# Patient Record
Sex: MALE | URBAN - METROPOLITAN AREA
[De-identification: names, ages, dates, MRNs, and addresses within clinical notes are randomized per-mention and may not be internally consistent; named-entity substitution may affect disease eponyms.]

---

## 2021-01-04 ENCOUNTER — APPOINTMENT (RX ONLY)
Dept: URBAN - METROPOLITAN AREA CLINIC 129 | Facility: CLINIC | Age: 81
Setting detail: DERMATOLOGY
End: 2021-01-04

## 2021-01-04 DIAGNOSIS — L29.8 OTHER PRURITUS: ICD-10-CM

## 2021-01-04 DIAGNOSIS — L29.89 OTHER PRURITUS: ICD-10-CM

## 2021-01-04 DIAGNOSIS — L30.0 NUMMULAR DERMATITIS: ICD-10-CM

## 2021-01-04 PROCEDURE — 99203 OFFICE O/P NEW LOW 30 MIN: CPT | Mod: 25

## 2021-01-04 PROCEDURE — ? PRESCRIPTION

## 2021-01-04 PROCEDURE — ? INJECTION

## 2021-01-04 PROCEDURE — 96372 THER/PROPH/DIAG INJ SC/IM: CPT

## 2021-01-04 PROCEDURE — ? COUNSELING

## 2021-01-04 RX ORDER — TRIAMCINOLONE ACETONIDE 1 MG/G
OINTMENT TOPICAL
Qty: 1 | Refills: 1 | Status: CANCELLED
Stop reason: SDUPTHER

## 2021-01-04 ASSESSMENT — LOCATION DETAILED DESCRIPTION DERM
LOCATION DETAILED: RIGHT INFERIOR MEDIAL MIDBACK
LOCATION DETAILED: RIGHT BUTTOCK
LOCATION DETAILED: EPIGASTRIC SKIN

## 2021-01-04 ASSESSMENT — LOCATION SIMPLE DESCRIPTION DERM
LOCATION SIMPLE: RIGHT LOWER BACK
LOCATION SIMPLE: RIGHT BUTTOCK
LOCATION SIMPLE: ABDOMEN

## 2021-01-04 ASSESSMENT — LOCATION ZONE DERM: LOCATION ZONE: TRUNK

## 2021-01-04 NOTE — PROCEDURE: INJECTION
Dose Administered (Numbers Only - Mg, G, Mcg, Units, Cc): 40
Medication (1) And Associated J-Code Units: Triamcinolone acetonide, 10mg
Units: mg
Route: IM
Medication (2) And Associated J-Code Units: Dexamethasone Sodium Phosphate, 4mg
Procedure Information: Please note that the numeric value listed in the Medication (1) and associated J-code units and Medication (2) and associated J-code units variables are j-code amounts and do not represent either the concentration or the total amount of the medications injected.  I strongly recommend selecting no to the Render J-code information in note question. This will allow your note to be more clear. If you are billing j-codes with your injection codes you need to document the total amount of the medication injected. This amount should match the j-code units. For example, if you are injecting Triamcinolone 40mg as an intramuscular injection you would select 40 for the dose field and mg for the units. This would allow you to document  with 4 units (40mg = 10mg x 4). The total volume is not used to calculate j-codes only the amount of the medication administered.
Dose Administered (Numbers Only - Mg, G, Mcg, Units, Cc): 4
Bill J-Code: yes
Additional Comments: Kenalog 40/ Dexamethasone 4 IM
Treatment Number: 0
Post-Care Instructions: I reviewed with the patient in detail post-care instructions. Patient understands to keep the injection sites clean and call the clinic if there is any redness, swelling or pain.
Consent: The risks of the medication were reviewed with the patient.
Hide Second Medication?: No
Detail Level: Simple

## 2021-01-18 ENCOUNTER — APPOINTMENT (RX ONLY)
Dept: URBAN - METROPOLITAN AREA CLINIC 129 | Facility: CLINIC | Age: 81
Setting detail: DERMATOLOGY
End: 2021-01-18

## 2021-01-18 DIAGNOSIS — L85.3 XEROSIS CUTIS: ICD-10-CM

## 2021-01-18 DIAGNOSIS — L30.0 NUMMULAR DERMATITIS: ICD-10-CM | Status: IMPROVED

## 2021-01-18 DIAGNOSIS — L29.8 OTHER PRURITUS: ICD-10-CM | Status: IMPROVED

## 2021-01-18 DIAGNOSIS — L29.89 OTHER PRURITUS: ICD-10-CM | Status: IMPROVED

## 2021-01-18 PROCEDURE — ? COUNSELING

## 2021-01-18 PROCEDURE — 99213 OFFICE O/P EST LOW 20 MIN: CPT

## 2021-03-24 ENCOUNTER — APPOINTMENT (RX ONLY)
Dept: URBAN - METROPOLITAN AREA CLINIC 129 | Facility: CLINIC | Age: 81
Setting detail: DERMATOLOGY
End: 2021-03-24

## 2021-03-24 DIAGNOSIS — L29.89 OTHER PRURITUS: ICD-10-CM

## 2021-03-24 DIAGNOSIS — L30.0 NUMMULAR DERMATITIS: ICD-10-CM

## 2021-03-24 DIAGNOSIS — L29.8 OTHER PRURITUS: ICD-10-CM

## 2021-03-24 DIAGNOSIS — L73.8 OTHER SPECIFIED FOLLICULAR DISORDERS: ICD-10-CM

## 2021-03-24 PROCEDURE — ? INJECTION

## 2021-03-24 PROCEDURE — 96372 THER/PROPH/DIAG INJ SC/IM: CPT

## 2021-03-24 PROCEDURE — 99212 OFFICE O/P EST SF 10 MIN: CPT | Mod: 25

## 2021-03-24 PROCEDURE — ? COUNSELING

## 2021-03-24 ASSESSMENT — LOCATION SIMPLE DESCRIPTION DERM
LOCATION SIMPLE: RIGHT SCALP
LOCATION SIMPLE: RIGHT BUTTOCK
LOCATION SIMPLE: LEFT FOREHEAD
LOCATION SIMPLE: RIGHT FOREHEAD

## 2021-03-24 ASSESSMENT — LOCATION ZONE DERM
LOCATION ZONE: SCALP
LOCATION ZONE: FACE
LOCATION ZONE: TRUNK

## 2021-03-24 ASSESSMENT — LOCATION DETAILED DESCRIPTION DERM
LOCATION DETAILED: LEFT SUPERIOR FOREHEAD
LOCATION DETAILED: RIGHT SUPERIOR FOREHEAD
LOCATION DETAILED: RIGHT MEDIAL FRONTAL SCALP
LOCATION DETAILED: RIGHT BUTTOCK

## 2021-03-24 NOTE — PROCEDURE: INJECTION
Consent: The risks of the medication were reviewed with the patient.
Post-Care Instructions: I reviewed with the patient in detail post-care instructions. Patient understands to keep the injection sites clean and call the clinic if there is any redness, swelling or pain.
Dose Administered (Numbers Only - Mg, G, Mcg, Units, Cc): 40
Route: IM
Treatment Number: 0
Bill J-Code: yes
Medication (2) And Associated J-Code Units: Dexamethasone Sodium Phosphate, 4mg
Procedure Information: Please note that the numeric value listed in the Medication (1) and associated J-code units and Medication (2) and associated J-code units variables are j-code amounts and do not represent either the concentration or the total amount of the medications injected.  I strongly recommend selecting no to the Render J-code information in note question. This will allow your note to be more clear. If you are billing j-codes with your injection codes you need to document the total amount of the medication injected. This amount should match the j-code units. For example, if you are injecting Triamcinolone 40mg as an intramuscular injection you would select 40 for the dose field and mg for the units. This would allow you to document  with 4 units (40mg = 10mg x 4). The total volume is not used to calculate j-codes only the amount of the medication administered.
Additional Comments: Kenalog 40 1cc
Units: mg
Units: cc
Dose Administered (Numbers Only - Mg, G, Mcg, Units, Cc): 1
Hide Second Medication?: No
Detail Level: Simple
Medication (1) And Associated J-Code Units: Triamcinolone acetonide, 10mg

## 2021-05-05 ENCOUNTER — APPOINTMENT (RX ONLY)
Dept: URBAN - METROPOLITAN AREA CLINIC 129 | Facility: CLINIC | Age: 81
Setting detail: DERMATOLOGY
End: 2021-05-05

## 2021-05-05 DIAGNOSIS — L21.8 OTHER SEBORRHEIC DERMATITIS: ICD-10-CM

## 2021-05-05 PROCEDURE — ? COUNSELING

## 2021-05-05 PROCEDURE — 99214 OFFICE O/P EST MOD 30 MIN: CPT

## 2021-05-05 PROCEDURE — ? ADDITIONAL NOTES

## 2021-05-05 PROCEDURE — ? PRESCRIPTION

## 2021-05-05 RX ORDER — HYDROCORTISONE 25 MG/G
CREAM TOPICAL
Qty: 1 | Refills: 2 | Status: ERX

## 2021-05-05 ASSESSMENT — LOCATION ZONE DERM: LOCATION ZONE: FACE

## 2021-05-05 ASSESSMENT — LOCATION SIMPLE DESCRIPTION DERM: LOCATION SIMPLE: LEFT TEMPLE

## 2021-05-05 ASSESSMENT — LOCATION DETAILED DESCRIPTION DERM: LOCATION DETAILED: LEFT MID TEMPLE

## 2021-05-17 ENCOUNTER — APPOINTMENT (RX ONLY)
Dept: URBAN - METROPOLITAN AREA CLINIC 129 | Facility: CLINIC | Age: 81
Setting detail: DERMATOLOGY
End: 2021-05-17

## 2021-05-17 DIAGNOSIS — L23.9 ALLERGIC CONTACT DERMATITIS, UNSPECIFIED CAUSE: ICD-10-CM

## 2021-05-17 PROCEDURE — 95044 PATCH/APPLICATION TESTS: CPT

## 2021-05-17 PROCEDURE — ? PATCH TESTING

## 2021-05-19 ENCOUNTER — APPOINTMENT (RX ONLY)
Dept: URBAN - METROPOLITAN AREA CLINIC 129 | Facility: CLINIC | Age: 81
Setting detail: DERMATOLOGY
End: 2021-05-19

## 2021-05-19 DIAGNOSIS — L23.9 ALLERGIC CONTACT DERMATITIS, UNSPECIFIED CAUSE: ICD-10-CM

## 2021-05-19 PROCEDURE — ? CORE ACDS PATCH TEST READING

## 2021-05-19 PROCEDURE — ? COUNSELING

## 2021-05-19 PROCEDURE — 99212 OFFICE O/P EST SF 10 MIN: CPT

## 2021-05-19 NOTE — PROCEDURE: CORE ACDS PATCH TEST READING
Allergen 24 Reaction: no reaction
Name Of Allergen 52: Chlorhexidine digluconate 0.5% aq
Name Of Allergen 73: Amidoamine 0.1% aq
Name Of Allergen 12: Cobalt chloride 1% pet
Name Of Allergen 11: Ethylenediamine dihydrochloride 1% pet
Name Of Allergen 49: D/L-?-Tocopherol 100%
Name Of Allergen 55: 2-Hydroxy-4-methoxybenzophenone (benzophenone-3) 10% pet
Name Of Allergen 66: Ethyleneurea melamine-formaldehyde 5% pet
Name Of Allergen 74: Ethyl cyanoacrylate 10% pet
Name Of Allergen 42: 3-(Dimethylamino)-propylamine 1% aq
Number Of Patches Read: 80
Name Of Allergen 50: Ethyl acrylate 0.1% pet
Detail Level: Zone
Name Of Allergen 51: Tea tree oil 5% pet
Name Of Allergen 30: Budesonide 0.1% pet
Name Of Allergen 13: k-shrl-Jrcgpaaajne formaldehyde resin 1% pet
Name Of Allergen 47: Lavender absolute 2% pet
Name Of Allergen 40: Cocamidopropyl betaine 1% aq
Name Of Allergen 34: Fragrance mix II 14% pet
Name Of Allergen 64: Jaycob 2% pet
Name Of Allergen 60: Benzalkonium chloride 0.1% aq
Name Of Allergen 79: 2-Ethylhexyl-4-methoxycinnamate 10% pet
Name Of Allergen 41: Mixed dialkyl thioureas 1% pet
Name Of Allergen 72: Clobetasol-17-propionate 1% pet
Show Negative Results In The Note?: Yes
Name Of Allergen 80: Benzyl alcohol 10% soft
Name Of Allergen 29: Imidazolidinyl urea 2% pet
Name Of Allergen 61: 2-Hydroxy-4-methoxybenzophenone-5-sulfonic acid (benzophenone-4) 2% pet
Name Of Allergen 5: DMDM hydantoin 1% pet
Name Of Allergen 28: Gold sodium thiosulfate 2% pet
Name Of Allergen 2: Lanolin alcohol (Amerchol 101) 50% pet
Name Of Allergen 48: Cinnamic aldehyde 1% pet
Name Of Allergen 44: Oleamidopropyl dimethylamine 0.1% aq
Name Of Allergen 26: Benzocaine 5% pet
Name Of Allergen 69: Cetyl steryl alcohol 20% pet
Name Of Allergen 36: Lidocaine 15% pet
Name Of Allergen 38: Iodopropynyl butylcarbamate 0.1% pet
Name Of Allergen 27: Tixocortol-21-pivalate
Name Of Allergen 35: Disperse blue 106/124 mix 1% pet
Name Of Allergen 7: Colophony 20% pet
Name Of Allergen 18: Quaternium 15 2% pet
Name Of Allergen 67: Sorbitan sesquioleate 20% pet
Name Of Allergen 78: 2,6-Ditert-butyl-4-cresol (BHT) 2% pet
Name Of Allergen 31: Hydrocortisone-17-butyrate 1% pet
Name Of Allergen 8: Paraben mix 12% pet
Name Of Allergen 33: Bacitracin 20% pet
Name Of Allergen 53: Propolis 10% pet
Name Of Allergen 58: Cocamide RUBÉN 0.5% pet
Name Of Allergen 9: Methylisothiazolinone 0.2% aq
Name Of Allergen 6: Fragrance mix I 8% pet
Name Of Allergen 68: n,n-Diphenylguanidine 1% pet
Name Of Allergen 4: Potassium dichromate 0.25% pet
Name Of Allergen 56: Tosylamide formaldehyde resin 10% pet
Name Of Allergen 65: Compositae mix II 5% pet
Name Of Allergen 71: Triamcinolone 1% pet
Name Of Allergen 39: Polymyxin B sulfate 3% pet
Name Of Allergen 3: Neomycin 20% pet
Name Of Allergen 62: Sodium benzoate 5% pet
Name Of Allergen 23: 2-Bromo-2-nitropropane-1,3-diol 0.5% pet
Name Of Allergen 43: Hydroxyethyl methacrylate 2% pet
Show Allergen Counseling In The Note?: No
Name Of Allergen 24: Thiuram mix 1% pet
Name Of Allergen 59: 4-Chloro-3-cresol ([facility]) 1% pet
Name Of Allergen 45: Decyl glucoside 5% pet
Name Of Allergen 75: Phenoxyethanol 1% pet
Name Of Allergen 76: Disperse Orange 3 1% pet
Name Of Allergen 22: Mercapto mix 1% pet
Name Of Allergen 63: Sorbic acid 2% pet
Name Of Allergen 15: Carba mix 3% pet
Name Of Allergen 37: Propylene glycol 100% aq
Name Of Allergen 17: Tetracaine hydrochloride 5% pet
Name Of Allergen 10: Balsam of [place] 25% pet
Name Of Allergen 70: Ethylhexylglycerin 5% pet
Name Of Allergen 1: Nickel sulfate 2.5% pet
Name Of Allergen 21: Formaldehyde 2% aq
Name Of Allergen 54: Chloroxylenol (PCMX) 1% pet
What Reading Time Point?: 48 hour
Name Of Allergen 77: Benzoic acid 5% pet
Name Of Allergen 19: Methyldibromoglutaronitrile 0.5% pet
Name Of Allergen 25: Diazolidinyl urea 1% pet
Name Of Allergen 20: p-Phenylenediamine 1% pet
Name Of Allergen 32: Mercaptobenzothiazole 1% pet
Name Of Allergen 46: Methyl methacrylate 2% pet
Name Of Allergen 57: Sesquiterpene lactone mix 0.1% pet
Name Of Allergen 16: Black rubber mix 0.6% pet
Name Of Allergen 14: Epoxy resin 1% pet

## 2021-05-21 ENCOUNTER — APPOINTMENT (RX ONLY)
Dept: URBAN - METROPOLITAN AREA CLINIC 129 | Facility: CLINIC | Age: 81
Setting detail: DERMATOLOGY
End: 2021-05-21

## 2021-05-21 DIAGNOSIS — L23.9 ALLERGIC CONTACT DERMATITIS, UNSPECIFIED CAUSE: ICD-10-CM

## 2021-05-21 PROCEDURE — 99214 OFFICE O/P EST MOD 30 MIN: CPT

## 2021-05-21 PROCEDURE — ? CORE ACDS PATCH TEST READING

## 2021-05-21 PROCEDURE — ? PRESCRIPTION

## 2021-05-21 PROCEDURE — ? COUNSELING

## 2021-05-21 PROCEDURE — ? ADDITIONAL NOTES

## 2021-05-21 RX ORDER — TRIAMCINOLONE ACETONIDE 1 MG/G
OINTMENT TOPICAL
Qty: 1 | Refills: 2 | Status: ERX

## 2021-05-21 ASSESSMENT — LOCATION ZONE DERM: LOCATION ZONE: TRUNK

## 2021-05-21 ASSESSMENT — LOCATION SIMPLE DESCRIPTION DERM: LOCATION SIMPLE: RIGHT UPPER BACK

## 2021-05-21 ASSESSMENT — LOCATION DETAILED DESCRIPTION DERM: LOCATION DETAILED: RIGHT SUPERIOR MEDIAL UPPER BACK

## 2021-05-21 NOTE — PROCEDURE: CORE ACDS PATCH TEST READING
Allergen 17 Reaction: no reaction
Name Of Allergen 40: Cocamidopropyl betaine 1% aq
Name Of Allergen 66: Ethyleneurea melamine-formaldehyde 5% pet
Name Of Allergen 34: Fragrance mix II 14% pet
Name Of Allergen 13: p-nlpw-Oclivcofmhd formaldehyde resin 1% pet
Name Of Allergen 54: Chloroxylenol (PCMX) 1% pet
Name Of Allergen 63: Sorbic acid 2% pet
Name Of Allergen 55: 2-Hydroxy-4-methoxybenzophenone (benzophenone-3) 10% pet
Name Of Allergen 17: Tetracaine hydrochloride 5% pet
Name Of Allergen 6: Fragrance mix I 8% pet
Name Of Allergen 23: 2-Bromo-2-nitropropane-1,3-diol 0.5% pet
Name Of Allergen 27: Tixocortol-21-pivalate
Name Of Allergen 31: Hydrocortisone-17-butyrate 1% pet
Name Of Allergen 30: Budesonide 0.1% pet
Name Of Allergen 75: Phenoxyethanol 1% pet
Name Of Allergen 38: Iodopropynyl butylcarbamate 0.1% pet
Name Of Allergen 46: Methyl methacrylate 2% pet
Name Of Allergen 10: Balsam of [place] 25% pet
Name Of Allergen 4: Potassium dichromate 0.25% pet
Name Of Allergen 20: p-Phenylenediamine 1% pet
Name Of Allergen 67: Sorbitan sesquioleate 20% pet
Name Of Allergen 64: Jaycob 2% pet
Name Of Allergen 77: Benzoic acid 5% pet
Name Of Allergen 21: Formaldehyde 2% aq
Name Of Allergen 71: Triamcinolone 1% pet
Name Of Allergen 1: Nickel sulfate 2.5% pet
Show Allergen Counseling In The Note?: No
Name Of Allergen 3: Neomycin 20% pet
Name Of Allergen 51: Tea tree oil 5% pet
Name Of Allergen 39: Polymyxin B sulfate 3% pet
Name Of Allergen 25: Diazolidinyl urea 1% pet
Detail Level: Zone
Name Of Allergen 62: Sodium benzoate 5% pet
Name Of Allergen 58: Cocamide RUBÉN 0.5% pet
Name Of Allergen 65: Compositae mix II 5% pet
Name Of Allergen 45: Decyl glucoside 5% pet
Name Of Allergen 76: Disperse Orange 3 1% pet
Name Of Allergen 32: Mercaptobenzothiazole 1% pet
Name Of Allergen 22: Mercapto mix 1% pet
Name Of Allergen 8: Paraben mix 12% pet
Name Of Allergen 52: Chlorhexidine digluconate 0.5% aq
Name Of Allergen 60: Benzalkonium chloride 0.1% aq
Name Of Allergen 79: 2-Ethylhexyl-4-methoxycinnamate 10% pet
Name Of Allergen 49: D/L-?-Tocopherol 100%
Name Of Allergen 78: 2,6-Ditert-butyl-4-cresol (BHT) 2% pet
Name Of Allergen 61: 2-Hydroxy-4-methoxybenzophenone-5-sulfonic acid (benzophenone-4) 2% pet
Name Of Allergen 37: Propylene glycol 100% aq
Name Of Allergen 7: Colophony 20% pet
Name Of Allergen 41: Mixed dialkyl thioureas 1% pet
Name Of Allergen 59: 4-Chloro-3-cresol ([facility]) 1% pet
Name Of Allergen 35: Disperse blue 106/124 mix 1% pet
Name Of Allergen 14: Epoxy resin 1% pet
Name Of Allergen 42: 3-(Dimethylamino)-propylamine 1% aq
Name Of Allergen 11: Ethylenediamine dihydrochloride 1% pet
Name Of Allergen 16: Black rubber mix 0.6% pet
Name Of Allergen 15: Carba mix 3% pet
Show Negative Results In The Note?: Yes
Name Of Allergen 80: Benzyl alcohol 10% soft
Name Of Allergen 33: Bacitracin 20% pet
Name Of Allergen 56: Tosylamide formaldehyde resin 10% pet
Name Of Allergen 48: Cinnamic aldehyde 1% pet
Name Of Allergen 24: Thiuram mix 1% pet
Name Of Allergen 70: Ethylhexylglycerin 5% pet
Name Of Allergen 19: Methyldibromoglutaronitrile 0.5% pet
Name Of Allergen 73: Amidoamine 0.1% aq
Name Of Allergen 47: Lavender absolute 2% pet
Name Of Allergen 44: Oleamidopropyl dimethylamine 0.1% aq
Number Of Patches Read: 80
Name Of Allergen 5: DMDM hydantoin 1% pet
Name Of Allergen 29: Imidazolidinyl urea 2% pet
Name Of Allergen 43: Hydroxyethyl methacrylate 2% pet
Name Of Allergen 2: Lanolin alcohol (Amerchol 101) 50% pet
Name Of Allergen 26: Benzocaine 5% pet
Name Of Allergen 36: Lidocaine 15% pet
Name Of Allergen 12: Cobalt chloride 1% pet
Name Of Allergen 74: Ethyl cyanoacrylate 10% pet
Name Of Allergen 50: Ethyl acrylate 0.1% pet
Name Of Allergen 68: n,n-Diphenylguanidine 1% pet
Name Of Allergen 53: Propolis 10% pet
What Reading Time Point?: 72 hour
Name Of Allergen 72: Clobetasol-17-propionate 1% pet
Name Of Allergen 28: Gold sodium thiosulfate 2% pet
Name Of Allergen 69: Cetyl steryl alcohol 20% pet
Name Of Allergen 57: Sesquiterpene lactone mix 0.1% pet
Name Of Allergen 18: Quaternium 15 2% pet
Name Of Allergen 9: Methylisothiazolinone 0.2% aq

## 2021-05-21 NOTE — PROCEDURE: ADDITIONAL NOTES
Render Risk Assessment In Note?: no
Additional Notes: Consider treatment for atopic dermatitis - Dupixent\\nRecommended CeraVe cream daily
Detail Level: Detailed

## 2021-06-04 ENCOUNTER — APPOINTMENT (RX ONLY)
Dept: URBAN - METROPOLITAN AREA CLINIC 129 | Facility: CLINIC | Age: 81
Setting detail: DERMATOLOGY
End: 2021-06-04

## 2021-06-04 DIAGNOSIS — L29.89 OTHER PRURITUS: ICD-10-CM | Status: IMPROVED

## 2021-06-04 DIAGNOSIS — L85.3 XEROSIS CUTIS: ICD-10-CM

## 2021-06-04 DIAGNOSIS — L73.8 OTHER SPECIFIED FOLLICULAR DISORDERS: ICD-10-CM | Status: RESOLVED

## 2021-06-04 DIAGNOSIS — L29.8 OTHER PRURITUS: ICD-10-CM | Status: IMPROVED

## 2021-06-04 PROCEDURE — 99213 OFFICE O/P EST LOW 20 MIN: CPT

## 2021-06-04 PROCEDURE — ? COUNSELING

## 2021-06-04 ASSESSMENT — LOCATION DETAILED DESCRIPTION DERM
LOCATION DETAILED: LEFT PROXIMAL PRETIBIAL REGION
LOCATION DETAILED: RIGHT PROXIMAL RADIAL DORSAL FOREARM
LOCATION DETAILED: LEFT SUPERIOR CENTRAL MALAR CHEEK
LOCATION DETAILED: RIGHT MEDIAL FOREHEAD
LOCATION DETAILED: RIGHT SUPERIOR UPPER BACK
LOCATION DETAILED: LEFT INFERIOR UPPER BACK
LOCATION DETAILED: RIGHT CENTRAL MALAR CHEEK
LOCATION DETAILED: GLABELLA
LOCATION DETAILED: LEFT DISTAL DORSAL FOREARM
LOCATION DETAILED: LEFT CENTRAL MALAR CHEEK
LOCATION DETAILED: RIGHT POSTERIOR SHOULDER
LOCATION DETAILED: RIGHT PROXIMAL PRETIBIAL REGION
LOCATION DETAILED: LEFT SUPERIOR LATERAL UPPER BACK
LOCATION DETAILED: LEFT SUPERIOR LATERAL MALAR CHEEK

## 2021-06-04 ASSESSMENT — LOCATION SIMPLE DESCRIPTION DERM
LOCATION SIMPLE: GLABELLA
LOCATION SIMPLE: LEFT FOREARM
LOCATION SIMPLE: RIGHT PRETIBIAL REGION
LOCATION SIMPLE: RIGHT CHEEK
LOCATION SIMPLE: LEFT CHEEK
LOCATION SIMPLE: LEFT PRETIBIAL REGION
LOCATION SIMPLE: LEFT UPPER BACK
LOCATION SIMPLE: RIGHT FOREHEAD
LOCATION SIMPLE: RIGHT UPPER BACK
LOCATION SIMPLE: RIGHT SHOULDER
LOCATION SIMPLE: RIGHT FOREARM

## 2021-06-04 ASSESSMENT — LOCATION ZONE DERM
LOCATION ZONE: FACE
LOCATION ZONE: ARM
LOCATION ZONE: LEG
LOCATION ZONE: TRUNK

## 2021-07-16 ENCOUNTER — APPOINTMENT (RX ONLY)
Dept: URBAN - METROPOLITAN AREA CLINIC 129 | Facility: CLINIC | Age: 81
Setting detail: DERMATOLOGY
End: 2021-07-16

## 2021-07-16 DIAGNOSIS — L29.89 OTHER PRURITUS: ICD-10-CM | Status: STABLE

## 2021-07-16 DIAGNOSIS — L29.8 OTHER PRURITUS: ICD-10-CM | Status: STABLE

## 2021-07-16 DIAGNOSIS — L71.0 PERIORAL DERMATITIS: ICD-10-CM | Status: INADEQUATELY CONTROLLED

## 2021-07-16 PROCEDURE — ? COUNSELING

## 2021-07-16 PROCEDURE — 99214 OFFICE O/P EST MOD 30 MIN: CPT

## 2021-07-16 PROCEDURE — ? PRESCRIPTION

## 2021-07-16 RX ORDER — TACROLIMUS 1 MG/G
OINTMENT TOPICAL
Qty: 1 | Refills: 3 | Status: ERX

## 2021-07-16 RX ORDER — HYDROCORTISONE 25 MG/G
CREAM TOPICAL
Qty: 1 | Refills: 2 | Status: ERX

## 2021-07-16 ASSESSMENT — LOCATION DETAILED DESCRIPTION DERM
LOCATION DETAILED: LEFT INFERIOR CENTRAL MALAR CHEEK
LOCATION DETAILED: RIGHT INFERIOR CENTRAL MALAR CHEEK
LOCATION DETAILED: RIGHT CENTRAL MALAR CHEEK
LOCATION DETAILED: LEFT SUPERIOR LATERAL UPPER BACK
LOCATION DETAILED: RIGHT POSTERIOR SHOULDER
LOCATION DETAILED: LEFT INFERIOR UPPER BACK
LOCATION DETAILED: LEFT CENTRAL MALAR CHEEK

## 2021-07-16 ASSESSMENT — LOCATION SIMPLE DESCRIPTION DERM
LOCATION SIMPLE: RIGHT SHOULDER
LOCATION SIMPLE: LEFT CHEEK
LOCATION SIMPLE: LEFT UPPER BACK
LOCATION SIMPLE: RIGHT CHEEK

## 2021-07-16 ASSESSMENT — LOCATION ZONE DERM
LOCATION ZONE: ARM
LOCATION ZONE: FACE
LOCATION ZONE: TRUNK

## 2021-09-16 ENCOUNTER — APPOINTMENT (RX ONLY)
Dept: URBAN - METROPOLITAN AREA CLINIC 129 | Facility: CLINIC | Age: 81
Setting detail: DERMATOLOGY
End: 2021-09-16

## 2021-09-16 DIAGNOSIS — L71.0 PERIORAL DERMATITIS: ICD-10-CM

## 2021-09-16 PROCEDURE — ? TREATMENT REGIMEN

## 2021-09-16 PROCEDURE — ? COUNSELING

## 2021-09-16 PROCEDURE — 99213 OFFICE O/P EST LOW 20 MIN: CPT

## 2021-09-16 PROCEDURE — ? PRESCRIPTION

## 2021-09-16 RX ORDER — TACROLIMUS 1 MG/G
OINTMENT TOPICAL
Qty: 60 | Refills: 3 | Status: ERX

## 2021-09-16 ASSESSMENT — LOCATION SIMPLE DESCRIPTION DERM
LOCATION SIMPLE: RIGHT CHEEK
LOCATION SIMPLE: LEFT CHEEK

## 2021-09-16 ASSESSMENT — LOCATION DETAILED DESCRIPTION DERM
LOCATION DETAILED: RIGHT INFERIOR CENTRAL MALAR CHEEK
LOCATION DETAILED: LEFT INFERIOR CENTRAL MALAR CHEEK

## 2021-09-16 ASSESSMENT — LOCATION ZONE DERM: LOCATION ZONE: FACE

## 2021-10-11 ENCOUNTER — APPOINTMENT (RX ONLY)
Dept: URBAN - METROPOLITAN AREA CLINIC 129 | Facility: CLINIC | Age: 81
Setting detail: DERMATOLOGY
End: 2021-10-11

## 2021-10-11 ENCOUNTER — APPOINTMENT (RX ONLY)
Dept: URBAN - METROPOLITAN AREA CLINIC 132 | Facility: CLINIC | Age: 81
Setting detail: DERMATOLOGY
End: 2021-10-11

## 2021-10-11 DIAGNOSIS — L71.0 PERIORAL DERMATITIS: ICD-10-CM

## 2021-10-11 PROCEDURE — ? COUNSELING

## 2021-10-11 PROCEDURE — ? PRESCRIPTION

## 2021-10-11 RX ORDER — DOXYCYCLINE 100 MG/1
TABLET, FILM COATED ORAL
Qty: 30 | Refills: 1

## 2021-10-11 RX ORDER — CLINDAMYCIN PHOSPHATE 10 MG/ML
LOTION TOPICAL
Qty: 60 | Refills: 3 | Status: ERX

## 2021-10-11 RX ORDER — CLINDAMYCIN PHOSPHATE 10 MG/ML
LOTION TOPICAL
Qty: 60 | Refills: 3

## 2021-10-11 ASSESSMENT — LOCATION SIMPLE DESCRIPTION DERM
LOCATION SIMPLE: LEFT CHEEK
LOCATION SIMPLE: RIGHT CHEEK

## 2021-10-11 ASSESSMENT — LOCATION ZONE DERM: LOCATION ZONE: FACE

## 2022-03-10 ENCOUNTER — APPOINTMENT (RX ONLY)
Dept: URBAN - METROPOLITAN AREA CLINIC 129 | Facility: CLINIC | Age: 82
Setting detail: DERMATOLOGY
End: 2022-03-10

## 2022-03-10 DIAGNOSIS — L82.1 OTHER SEBORRHEIC KERATOSIS: ICD-10-CM

## 2022-03-10 DIAGNOSIS — L82.0 INFLAMED SEBORRHEIC KERATOSIS: ICD-10-CM

## 2022-03-10 DIAGNOSIS — L85.3 XEROSIS CUTIS: ICD-10-CM

## 2022-03-10 DIAGNOSIS — H01.13 ECZEMATOUS DERMATITIS OF EYELID: ICD-10-CM | Status: INADEQUATELY CONTROLLED

## 2022-03-10 PROBLEM — H01.139 ECZEMATOUS DERMATITIS OF UNSPECIFIED EYE, UNSPECIFIED EYELID: Status: ACTIVE | Noted: 2022-03-10

## 2022-03-10 PROBLEM — H01.131 ECZEMATOUS DERMATITIS OF RIGHT UPPER EYELID: Status: ACTIVE | Noted: 2022-03-10

## 2022-03-10 PROCEDURE — 17110 DESTRUCTION B9 LES UP TO 14: CPT

## 2022-03-10 PROCEDURE — 99214 OFFICE O/P EST MOD 30 MIN: CPT | Mod: 25

## 2022-03-10 PROCEDURE — ? PRESCRIPTION

## 2022-03-10 PROCEDURE — ? COUNSELING

## 2022-03-10 PROCEDURE — ? LIQUID NITROGEN

## 2022-03-10 RX ORDER — TACROLIMUS 1 MG/G
OINTMENT TOPICAL BID
Qty: 60 | Refills: 11 | Status: ERX

## 2022-03-10 ASSESSMENT — LOCATION DETAILED DESCRIPTION DERM
LOCATION DETAILED: RIGHT PROXIMAL RADIAL DORSAL FOREARM
LOCATION DETAILED: LEFT PROXIMAL POSTERIOR UPPER ARM
LOCATION DETAILED: RIGHT RADIAL DORSAL HAND
LOCATION DETAILED: RIGHT CENTRAL EYEBROW
LOCATION DETAILED: RIGHT SUPERIOR MEDIAL UPPER BACK
LOCATION DETAILED: RIGHT DISTAL DORSAL FOREARM
LOCATION DETAILED: LEFT MID-UPPER BACK
LOCATION DETAILED: LEFT RADIAL DORSAL HAND
LOCATION DETAILED: LEFT CENTRAL EYEBROW
LOCATION DETAILED: RIGHT PROXIMAL DORSAL FOREARM
LOCATION DETAILED: RIGHT SUPRATARSAL CREASE
LOCATION DETAILED: LEFT PROXIMAL DORSAL FOREARM
LOCATION DETAILED: LEFT DISTAL DORSAL FOREARM
LOCATION DETAILED: RIGHT DISTAL POSTERIOR UPPER ARM
LOCATION DETAILED: LEFT SUPERIOR UPPER BACK
LOCATION DETAILED: INFERIOR THORACIC SPINE
LOCATION DETAILED: RIGHT SUPERIOR LATERAL UPPER BACK
LOCATION DETAILED: RIGHT MEDIAL TRAPEZIAL NECK
LOCATION DETAILED: LEFT SUPERIOR CENTRAL MALAR CHEEK
LOCATION DETAILED: LEFT PROXIMAL ULNAR DORSAL FOREARM
LOCATION DETAILED: LEFT MEDIAL EYEBROW
LOCATION DETAILED: LEFT SUPERIOR LATERAL UPPER BACK

## 2022-03-10 ASSESSMENT — LOCATION SIMPLE DESCRIPTION DERM
LOCATION SIMPLE: RIGHT UPPER ARM
LOCATION SIMPLE: RIGHT UPPER BACK
LOCATION SIMPLE: UPPER BACK
LOCATION SIMPLE: LEFT UPPER ARM
LOCATION SIMPLE: LEFT HAND
LOCATION SIMPLE: LEFT UPPER BACK
LOCATION SIMPLE: LEFT FOREARM
LOCATION SIMPLE: RIGHT FOREARM
LOCATION SIMPLE: RIGHT EYEBROW
LOCATION SIMPLE: POSTERIOR NECK
LOCATION SIMPLE: RIGHT SUPERIOR EYELID
LOCATION SIMPLE: LEFT CHEEK
LOCATION SIMPLE: LEFT EYEBROW
LOCATION SIMPLE: RIGHT HAND

## 2022-03-10 ASSESSMENT — LOCATION ZONE DERM
LOCATION ZONE: EYELID
LOCATION ZONE: NECK
LOCATION ZONE: ARM
LOCATION ZONE: TRUNK
LOCATION ZONE: HAND
LOCATION ZONE: FACE

## 2022-03-10 NOTE — PROCEDURE: LIQUID NITROGEN
Include Z78.9 (Other Specified Conditions Influencing Health Status) As An Associated Diagnosis?: No
Detail Level: Detailed
Medical Necessity Information: It is in your best interest to select a reason for this procedure from the list below. All of these items fulfill various CMS LCD requirements except the new and changing color options.
Show Aperture Variable?: Yes
Medical Necessity Clause: This procedure was medically necessary because the lesions that were treated were:
Consent: The patient's consent was obtained including but not limited to risks of crusting, scabbing, blistering, scarring, darker or lighter pigmentary change, recurrence, incomplete removal and infection.
Post-Care Instructions: I reviewed with the patient in detail post-care instructions. Patient is to wear sunprotection, and avoid picking at any of the treated lesions. Pt may apply Vaseline to crusted or scabbing areas.
Spray Paint Text: The liquid nitrogen was applied to the skin utilizing a spray paint frosting technique.
Number Of Freeze-Thaw Cycles: 3 freeze-thaw cycles

## 2022-03-31 ENCOUNTER — APPOINTMENT (RX ONLY)
Dept: URBAN - METROPOLITAN AREA CLINIC 129 | Facility: CLINIC | Age: 82
Setting detail: DERMATOLOGY
End: 2022-03-31

## 2022-03-31 DIAGNOSIS — H01.13 ECZEMATOUS DERMATITIS OF EYELID: ICD-10-CM

## 2022-03-31 PROBLEM — H01.139 ECZEMATOUS DERMATITIS OF UNSPECIFIED EYE, UNSPECIFIED EYELID: Status: ACTIVE | Noted: 2022-03-31

## 2022-03-31 PROCEDURE — ? PRESCRIPTION

## 2022-03-31 RX ORDER — HYDROCORTISONE 25 MG/G
OINTMENT TOPICAL
Qty: 1 | Refills: 2 | Status: ERX

## 2024-03-11 ENCOUNTER — APPOINTMENT (RX ONLY)
Dept: URBAN - METROPOLITAN AREA CLINIC 129 | Facility: CLINIC | Age: 84
Setting detail: DERMATOLOGY
End: 2024-03-11

## 2024-03-11 DIAGNOSIS — L30.0 NUMMULAR DERMATITIS: ICD-10-CM

## 2024-03-11 PROCEDURE — ? COUNSELING

## 2024-03-11 PROCEDURE — 99214 OFFICE O/P EST MOD 30 MIN: CPT

## 2024-03-11 PROCEDURE — ? PRESCRIPTION

## 2024-03-11 RX ORDER — TRIAMCINOLONE ACETONIDE 1 MG/G
OINTMENT TOPICAL
Qty: 454 | Refills: 2 | Status: ERX | COMMUNITY
Start: 2024-03-11

## 2024-03-11 RX ADMIN — TRIAMCINOLONE ACETONIDE: 1 OINTMENT TOPICAL at 00:00

## 2024-03-11 ASSESSMENT — LOCATION ZONE DERM
LOCATION ZONE: ARM
LOCATION ZONE: TRUNK

## 2024-03-11 ASSESSMENT — LOCATION DETAILED DESCRIPTION DERM
LOCATION DETAILED: RIGHT POSTERIOR SHOULDER
LOCATION DETAILED: LEFT POSTERIOR SHOULDER
LOCATION DETAILED: LEFT DISTAL DORSAL FOREARM
LOCATION DETAILED: LEFT MID-UPPER BACK
LOCATION DETAILED: RIGHT PROXIMAL POSTERIOR UPPER ARM
LOCATION DETAILED: RIGHT DISTAL DORSAL FOREARM
LOCATION DETAILED: RIGHT SUPERIOR MEDIAL MIDBACK
LOCATION DETAILED: RIGHT SUPERIOR LATERAL UPPER BACK
LOCATION DETAILED: LEFT PROXIMAL POSTERIOR UPPER ARM

## 2024-03-11 ASSESSMENT — LOCATION SIMPLE DESCRIPTION DERM
LOCATION SIMPLE: LEFT FOREARM
LOCATION SIMPLE: RIGHT UPPER ARM
LOCATION SIMPLE: LEFT UPPER BACK
LOCATION SIMPLE: LEFT UPPER ARM
LOCATION SIMPLE: RIGHT LOWER BACK
LOCATION SIMPLE: RIGHT SHOULDER
LOCATION SIMPLE: RIGHT UPPER BACK
LOCATION SIMPLE: RIGHT FOREARM
LOCATION SIMPLE: LEFT SHOULDER

## 2024-04-22 ENCOUNTER — APPOINTMENT (RX ONLY)
Dept: URBAN - METROPOLITAN AREA CLINIC 129 | Facility: CLINIC | Age: 84
Setting detail: DERMATOLOGY
End: 2024-04-22

## 2024-04-22 DIAGNOSIS — L30.0 NUMMULAR DERMATITIS: ICD-10-CM | Status: RESOLVED

## 2024-04-22 PROCEDURE — 99213 OFFICE O/P EST LOW 20 MIN: CPT

## 2024-04-22 PROCEDURE — ? COUNSELING

## 2024-04-22 PROCEDURE — ? TREATMENT REGIMEN

## 2024-04-22 ASSESSMENT — LOCATION SIMPLE DESCRIPTION DERM
LOCATION SIMPLE: RIGHT SHOULDER
LOCATION SIMPLE: RIGHT UPPER BACK
LOCATION SIMPLE: RIGHT LOWER BACK
LOCATION SIMPLE: LEFT SHOULDER
LOCATION SIMPLE: RIGHT UPPER ARM
LOCATION SIMPLE: LEFT FOREARM
LOCATION SIMPLE: LEFT UPPER BACK
LOCATION SIMPLE: LEFT UPPER ARM
LOCATION SIMPLE: RIGHT FOREARM

## 2024-04-22 ASSESSMENT — LOCATION DETAILED DESCRIPTION DERM
LOCATION DETAILED: RIGHT PROXIMAL POSTERIOR UPPER ARM
LOCATION DETAILED: RIGHT SUPERIOR MEDIAL MIDBACK
LOCATION DETAILED: RIGHT POSTERIOR SHOULDER
LOCATION DETAILED: RIGHT DISTAL DORSAL FOREARM
LOCATION DETAILED: LEFT MID-UPPER BACK
LOCATION DETAILED: LEFT DISTAL DORSAL FOREARM
LOCATION DETAILED: RIGHT SUPERIOR LATERAL UPPER BACK
LOCATION DETAILED: LEFT POSTERIOR SHOULDER
LOCATION DETAILED: LEFT PROXIMAL POSTERIOR UPPER ARM

## 2024-04-22 ASSESSMENT — LOCATION ZONE DERM
LOCATION ZONE: TRUNK
LOCATION ZONE: ARM

## 2024-08-01 ENCOUNTER — OFFICE VISIT (OUTPATIENT)
Dept: URBAN - METROPOLITAN AREA CLINIC 3 | Facility: CLINIC | Age: 84
End: 2024-08-01
Payer: COMMERCIAL

## 2024-08-01 DIAGNOSIS — H43.813 VITREOUS DEGENERATION, BILATERAL: ICD-10-CM

## 2024-08-01 DIAGNOSIS — H25.813 COMBINED FORMS OF AGE-RELATED CATARACT, BILATERAL: Primary | ICD-10-CM

## 2024-08-01 DIAGNOSIS — H11.153 PINGUECULA, BILATERAL: ICD-10-CM

## 2024-08-01 PROCEDURE — 99204 OFFICE O/P NEW MOD 45 MIN: CPT | Performed by: OPTOMETRIST

## 2024-08-01 ASSESSMENT — INTRAOCULAR PRESSURE
OS: 12
OD: 15

## 2024-08-12 ENCOUNTER — TECH ONLY (OUTPATIENT)
Dept: URBAN - METROPOLITAN AREA CLINIC 3 | Facility: CLINIC | Age: 84
End: 2024-08-12
Payer: COMMERCIAL

## 2024-08-12 DIAGNOSIS — H25.812 COMBINED FORMS OF AGE-RELATED CATARACT, LEFT EYE: ICD-10-CM

## 2024-08-12 DIAGNOSIS — H25.813 COMBINED FORMS OF AGE-RELATED CATARACT, BILATERAL: Primary | ICD-10-CM

## 2024-08-22 ENCOUNTER — PROCEDURE (OUTPATIENT)
Dept: URBAN - METROPOLITAN AREA SURGERY 1 | Facility: SURGERY | Age: 84
End: 2024-08-22
Payer: COMMERCIAL

## 2024-08-22 ENCOUNTER — Encounter (OUTPATIENT)
Dept: URBAN - METROPOLITAN AREA SURGERY 2 | Facility: SURGERY | Age: 84
End: 2024-08-22
Payer: COMMERCIAL

## 2024-08-22 PROCEDURE — 66984 XCAPSL CTRC RMVL W/O ECP: CPT | Performed by: OPHTHALMOLOGY

## 2024-08-23 ENCOUNTER — POST-OPERATIVE VISIT (OUTPATIENT)
Dept: URBAN - METROPOLITAN AREA CLINIC 3 | Facility: CLINIC | Age: 84
End: 2024-08-23

## 2024-08-23 DIAGNOSIS — Z48.810 ENCOUNTER FOR SURGICAL AFTERCARE FOLLOWING SURGERY ON A SENSE ORGAN: Primary | ICD-10-CM

## 2024-08-23 PROCEDURE — 99024 POSTOP FOLLOW-UP VISIT: CPT | Performed by: OPTOMETRIST

## 2024-08-23 RX ORDER — PREDNISOLONE ACETATE 10 MG/ML
1 % SUSPENSION/ DROPS OPHTHALMIC
Qty: 10 | Refills: 0 | Status: INACTIVE
Start: 2024-08-23 | End: 2024-09-23

## 2024-08-23 ASSESSMENT — INTRAOCULAR PRESSURE
OS: 16
OD: 22

## 2024-09-04 ENCOUNTER — Encounter (OUTPATIENT)
Dept: URBAN - METROPOLITAN AREA SURGERY 2 | Facility: SURGERY | Age: 84
End: 2024-09-04
Payer: COMMERCIAL

## 2024-09-04 ENCOUNTER — PROCEDURE (OUTPATIENT)
Dept: URBAN - METROPOLITAN AREA SURGERY 1 | Facility: SURGERY | Age: 84
End: 2024-09-04
Payer: COMMERCIAL

## 2024-09-04 PROCEDURE — 66984 XCAPSL CTRC RMVL W/O ECP: CPT | Performed by: OPHTHALMOLOGY

## 2024-09-05 ENCOUNTER — POST-OPERATIVE VISIT (OUTPATIENT)
Dept: URBAN - METROPOLITAN AREA CLINIC 3 | Facility: CLINIC | Age: 84
End: 2024-09-05
Payer: COMMERCIAL

## 2024-09-05 DIAGNOSIS — Z96.1 PRESENCE OF INTRAOCULAR LENS: Primary | ICD-10-CM

## 2024-09-05 PROCEDURE — 99024 POSTOP FOLLOW-UP VISIT: CPT | Performed by: OPTOMETRIST

## 2024-09-05 RX ORDER — PREDNISOLONE ACETATE 10 MG/ML
1 % SUSPENSION/ DROPS OPHTHALMIC
Qty: 10 | Refills: 0 | Status: ACTIVE
Start: 2024-09-05

## 2024-09-05 ASSESSMENT — INTRAOCULAR PRESSURE
OD: 13
OS: 14

## 2024-09-13 ENCOUNTER — POST-OPERATIVE VISIT (OUTPATIENT)
Dept: URBAN - METROPOLITAN AREA CLINIC 3 | Facility: CLINIC | Age: 84
End: 2024-09-13
Payer: COMMERCIAL

## 2024-09-13 DIAGNOSIS — Z96.1 PRESENCE OF INTRAOCULAR LENS: Primary | ICD-10-CM

## 2024-09-13 PROCEDURE — 99024 POSTOP FOLLOW-UP VISIT: CPT | Performed by: OPTOMETRIST

## 2024-09-13 ASSESSMENT — INTRAOCULAR PRESSURE
OD: 16
OS: 12

## 2024-12-13 ENCOUNTER — OFFICE VISIT (OUTPATIENT)
Dept: URBAN - METROPOLITAN AREA CLINIC 3 | Facility: CLINIC | Age: 84
End: 2024-12-13
Payer: COMMERCIAL

## 2024-12-13 DIAGNOSIS — Z96.1 PRESENCE OF INTRAOCULAR LENS: ICD-10-CM

## 2024-12-13 DIAGNOSIS — H20.011 PRIMARY IRIDOCYCLITIS, RIGHT EYE: Primary | ICD-10-CM

## 2024-12-13 DIAGNOSIS — H43.813 VITREOUS DEGENERATION, BILATERAL: ICD-10-CM

## 2024-12-13 DIAGNOSIS — H11.153 PINGUECULA, BILATERAL: ICD-10-CM

## 2024-12-13 DIAGNOSIS — H43.313 VITREOUS MEMBRANES AND STRANDS, BILATERAL: ICD-10-CM

## 2024-12-13 PROCEDURE — 99214 OFFICE O/P EST MOD 30 MIN: CPT | Performed by: OPTOMETRIST

## 2024-12-13 RX ORDER — PREDNISOLONE ACETATE 10 MG/ML
1 % SUSPENSION/ DROPS OPHTHALMIC
Qty: 10 | Refills: 0 | Status: INACTIVE
Start: 2024-12-13 | End: 2025-01-13

## 2024-12-13 ASSESSMENT — INTRAOCULAR PRESSURE
OD: 16
OS: 8

## 2024-12-26 ENCOUNTER — OFFICE VISIT (OUTPATIENT)
Dept: URBAN - METROPOLITAN AREA CLINIC 3 | Facility: CLINIC | Age: 84
End: 2024-12-26
Payer: COMMERCIAL

## 2024-12-26 DIAGNOSIS — H20.011 PRIMARY IRIDOCYCLITIS, RIGHT EYE: Primary | ICD-10-CM

## 2024-12-26 DIAGNOSIS — Z96.1 PRESENCE OF INTRAOCULAR LENS: ICD-10-CM

## 2024-12-26 DIAGNOSIS — H11.153 PINGUECULA, BILATERAL: ICD-10-CM

## 2024-12-26 PROCEDURE — 99212 OFFICE O/P EST SF 10 MIN: CPT | Performed by: OPTOMETRIST

## 2024-12-26 RX ORDER — PREDNISOLONE ACETATE 10 MG/ML
1 % SUSPENSION/ DROPS OPHTHALMIC
Qty: 10 | Refills: 0 | Status: INACTIVE
Start: 2024-12-26 | End: 2025-01-26

## 2024-12-26 RX ORDER — BRIMONIDINE TARTRATE, TIMOLOL MALEATE 2; 5 MG/ML; MG/ML
SOLUTION/ DROPS OPHTHALMIC
Qty: 5 | Refills: 7 | Status: ACTIVE
Start: 2024-12-26

## 2024-12-26 ASSESSMENT — INTRAOCULAR PRESSURE
OD: 44
OS: 7

## 2025-01-02 ENCOUNTER — OFFICE VISIT (OUTPATIENT)
Dept: URBAN - METROPOLITAN AREA CLINIC 3 | Facility: CLINIC | Age: 85
End: 2025-01-02
Payer: COMMERCIAL

## 2025-01-02 DIAGNOSIS — H20.011 PRIMARY IRIDOCYCLITIS, RIGHT EYE: Primary | ICD-10-CM

## 2025-01-02 PROCEDURE — 99212 OFFICE O/P EST SF 10 MIN: CPT | Performed by: OPTOMETRIST

## 2025-01-02 RX ORDER — LATANOPROST 50 UG/ML
0.005 % SOLUTION OPHTHALMIC
Qty: 2.5 | Refills: 7 | Status: ACTIVE
Start: 2025-01-02

## 2025-01-02 RX ORDER — PREDNISOLONE ACETATE 10 MG/ML
1 % SUSPENSION/ DROPS OPHTHALMIC
Qty: 10 | Refills: 0 | Status: ACTIVE
Start: 2025-01-02

## 2025-01-02 RX ORDER — DORZOLAMIDE HCL 20 MG/ML
2 % SOLUTION/ DROPS OPHTHALMIC
Qty: 5 | Refills: 7 | Status: ACTIVE
Start: 2025-01-02

## 2025-01-02 ASSESSMENT — INTRAOCULAR PRESSURE
OS: 8
OD: 36

## 2025-01-10 ENCOUNTER — OFFICE VISIT (OUTPATIENT)
Dept: URBAN - METROPOLITAN AREA CLINIC 3 | Facility: CLINIC | Age: 85
End: 2025-01-10
Payer: COMMERCIAL

## 2025-01-10 DIAGNOSIS — H20.011 PRIMARY IRIDOCYCLITIS, RIGHT EYE: Primary | ICD-10-CM

## 2025-01-10 PROCEDURE — 99212 OFFICE O/P EST SF 10 MIN: CPT | Performed by: OPTOMETRIST

## 2025-01-10 RX ORDER — VALACYCLOVIR HYDROCHLORIDE 500 MG/1
500 MG TABLET, FILM COATED ORAL
Qty: 30 | Refills: 0 | Status: INACTIVE
Start: 2025-01-10 | End: 2025-01-19

## 2025-01-10 ASSESSMENT — INTRAOCULAR PRESSURE
OS: 7
OD: 37

## 2025-01-16 ENCOUNTER — OFFICE VISIT (OUTPATIENT)
Dept: URBAN - METROPOLITAN AREA CLINIC 3 | Facility: CLINIC | Age: 85
End: 2025-01-16
Payer: COMMERCIAL

## 2025-01-16 DIAGNOSIS — H20.11 CHRONIC IRIDOCYCLITIS, RIGHT EYE: Primary | ICD-10-CM

## 2025-01-16 PROCEDURE — 92012 INTRM OPH EXAM EST PATIENT: CPT | Performed by: OPHTHALMOLOGY

## 2025-01-16 RX ORDER — VALACYCLOVIR 500 MG/1
500 MG TABLET, FILM COATED ORAL
Qty: 30 | Refills: 0 | Status: ACTIVE
Start: 2025-01-16

## 2025-01-16 ASSESSMENT — INTRAOCULAR PRESSURE
OD: 39
OS: 10

## 2025-01-27 ENCOUNTER — OFFICE VISIT (OUTPATIENT)
Dept: URBAN - METROPOLITAN AREA CLINIC 3 | Facility: CLINIC | Age: 85
End: 2025-01-27
Payer: COMMERCIAL

## 2025-01-27 DIAGNOSIS — H20.11 CHRONIC IRIDOCYCLITIS, RIGHT EYE: Primary | ICD-10-CM

## 2025-01-27 DIAGNOSIS — H40.051 OCULAR HYPERTENSION, RIGHT EYE: ICD-10-CM

## 2025-01-27 PROCEDURE — 99212 OFFICE O/P EST SF 10 MIN: CPT | Performed by: OPHTHALMOLOGY

## 2025-01-27 RX ORDER — BRIMONIDINE TARTRATE, TIMOLOL MALEATE 2; 5 MG/ML; MG/ML
SOLUTION/ DROPS OPHTHALMIC
Qty: 10 | Refills: 0 | Status: ACTIVE
Start: 2025-01-27

## 2025-01-27 RX ORDER — PREDNISOLONE ACETATE 10 MG/ML
1 % SUSPENSION/ DROPS OPHTHALMIC
Qty: 10 | Refills: 0 | Status: ACTIVE
Start: 2025-01-27

## 2025-01-27 RX ORDER — DORZOLAMIDE HCL 20 MG/ML
2 % SOLUTION/ DROPS OPHTHALMIC
Qty: 5 | Refills: 0 | Status: ACTIVE
Start: 2025-01-27

## 2025-01-27 ASSESSMENT — INTRAOCULAR PRESSURE
OD: 23
OS: 8

## 2025-02-06 ENCOUNTER — OFFICE VISIT (OUTPATIENT)
Dept: URBAN - METROPOLITAN AREA CLINIC 3 | Facility: CLINIC | Age: 85
End: 2025-02-06
Payer: COMMERCIAL

## 2025-02-06 DIAGNOSIS — H20.11 CHRONIC IRIDOCYCLITIS, RIGHT EYE: Primary | ICD-10-CM

## 2025-02-06 PROCEDURE — 99212 OFFICE O/P EST SF 10 MIN: CPT | Performed by: OPHTHALMOLOGY

## 2025-02-06 RX ORDER — PREDNISOLONE ACETATE 10 MG/ML
1 % SUSPENSION/ DROPS OPHTHALMIC
Qty: 5 | Refills: 0 | Status: ACTIVE
Start: 2025-02-06

## 2025-02-06 RX ORDER — DORZOLAMIDE HYDROCHLORIDE AND TIMOLOL MALEATE 20; 5 MG/ML; MG/ML
SOLUTION/ DROPS OPHTHALMIC
Qty: 10 | Refills: 0 | Status: ACTIVE
Start: 2025-02-06

## 2025-02-06 ASSESSMENT — INTRAOCULAR PRESSURE
OS: 8
OD: 25

## 2025-02-20 ENCOUNTER — OFFICE VISIT (OUTPATIENT)
Dept: URBAN - METROPOLITAN AREA CLINIC 3 | Facility: CLINIC | Age: 85
End: 2025-02-20
Payer: COMMERCIAL

## 2025-02-20 DIAGNOSIS — H20.11 CHRONIC IRIDOCYCLITIS, RIGHT EYE: Primary | ICD-10-CM

## 2025-02-20 DIAGNOSIS — H40.41X3 GLAUCOMA SECONDARY TO EYE INFLAM, RIGHT EYE, SEVERE STAGE: ICD-10-CM

## 2025-02-20 PROCEDURE — 99212 OFFICE O/P EST SF 10 MIN: CPT | Performed by: OPHTHALMOLOGY

## 2025-02-20 RX ORDER — LEVOFLOXACIN 750 MG/1
750 MG TABLET ORAL
Qty: 14 | Refills: 0 | Status: ACTIVE
Start: 2025-02-20

## 2025-02-20 ASSESSMENT — INTRAOCULAR PRESSURE
OS: 8
OD: 32

## 2025-03-07 ENCOUNTER — OFFICE VISIT (OUTPATIENT)
Dept: URBAN - METROPOLITAN AREA CLINIC 3 | Facility: CLINIC | Age: 85
End: 2025-03-07
Payer: COMMERCIAL

## 2025-03-07 DIAGNOSIS — H21.41: ICD-10-CM

## 2025-03-07 DIAGNOSIS — H44.001 UNSPECIFIED PURULENT ENDOPHTHALMITIS, RIGHT EYE: Primary | ICD-10-CM

## 2025-03-07 RX ORDER — TOBRAMYCIN 3 MG/ML
0.3 % SOLUTION OPHTHALMIC
Qty: 10 | Refills: 2 | Status: ACTIVE
Start: 2025-03-07

## 2025-03-07 RX ORDER — DORZOLAMIDE HCL 20 MG/ML
2 % SOLUTION/ DROPS OPHTHALMIC
Qty: 10 | Refills: 0 | Status: ACTIVE
Start: 2025-03-07

## 2025-03-07 ASSESSMENT — INTRAOCULAR PRESSURE
OD: 19
OD: 32
OS: 10

## 2025-03-26 ENCOUNTER — OFFICE VISIT (OUTPATIENT)
Dept: URBAN - METROPOLITAN AREA CLINIC 3 | Facility: CLINIC | Age: 85
End: 2025-03-26
Payer: COMMERCIAL

## 2025-03-26 DIAGNOSIS — H40.051 OCULAR HYPERTENSION, RIGHT EYE: ICD-10-CM

## 2025-03-26 DIAGNOSIS — H26.491 OTHER SECONDARY CATARACT, RIGHT EYE: ICD-10-CM

## 2025-03-26 DIAGNOSIS — H44.001 UNSPECIFIED PURULENT ENDOPHTHALMITIS, RIGHT EYE: Primary | ICD-10-CM

## 2025-03-26 DIAGNOSIS — H21.41: ICD-10-CM

## 2025-03-26 PROCEDURE — 99212 OFFICE O/P EST SF 10 MIN: CPT | Performed by: OPHTHALMOLOGY

## 2025-03-26 ASSESSMENT — INTRAOCULAR PRESSURE
OS: 5
OD: 18

## 2025-04-23 ENCOUNTER — OFFICE VISIT (OUTPATIENT)
Dept: URBAN - METROPOLITAN AREA CLINIC 3 | Facility: CLINIC | Age: 85
End: 2025-04-23
Payer: COMMERCIAL

## 2025-04-23 DIAGNOSIS — H26.491 OTHER SECONDARY CATARACT, RIGHT EYE: ICD-10-CM

## 2025-04-23 DIAGNOSIS — H40.051 OCULAR HYPERTENSION, RIGHT EYE: ICD-10-CM

## 2025-04-23 DIAGNOSIS — H44.001 UNSPECIFIED PURULENT ENDOPHTHALMITIS, RIGHT EYE: Primary | ICD-10-CM

## 2025-04-23 PROCEDURE — 99212 OFFICE O/P EST SF 10 MIN: CPT | Performed by: OPHTHALMOLOGY

## 2025-04-23 RX ORDER — BRIMONIDINE TARTRATE AND TIMOLOL MALEATE 2; 5 MG/ML; MG/ML
SOLUTION/ DROPS OPHTHALMIC
Qty: 10 | Refills: 3 | Status: ACTIVE
Start: 2025-04-23

## 2025-04-23 RX ORDER — DORZOLAMIDE HCL 20 MG/ML
2 % SOLUTION/ DROPS OPHTHALMIC
Qty: 10 | Refills: 3 | Status: ACTIVE
Start: 2025-04-23

## 2025-04-23 ASSESSMENT — INTRAOCULAR PRESSURE
OS: 10
OD: 25

## 2025-07-28 ENCOUNTER — OFFICE VISIT (OUTPATIENT)
Dept: URBAN - METROPOLITAN AREA CLINIC 3 | Facility: CLINIC | Age: 85
End: 2025-07-28
Payer: COMMERCIAL

## 2025-07-28 DIAGNOSIS — H44.001 UNSPECIFIED PURULENT ENDOPHTHALMITIS, RIGHT EYE: Primary | ICD-10-CM

## 2025-07-28 DIAGNOSIS — H40.051 OCULAR HYPERTENSION, RIGHT EYE: ICD-10-CM

## 2025-07-28 DIAGNOSIS — H26.493 OTHER SECONDARY CATARACT, BILATERAL: ICD-10-CM

## 2025-07-28 PROCEDURE — 92133 CPTRZD OPH DX IMG PST SGM ON: CPT | Performed by: OPHTHALMOLOGY

## 2025-07-28 PROCEDURE — 92250 FUNDUS PHOTOGRAPHY W/I&R: CPT | Performed by: OPHTHALMOLOGY

## 2025-07-28 PROCEDURE — 99214 OFFICE O/P EST MOD 30 MIN: CPT | Performed by: OPHTHALMOLOGY

## 2025-07-28 RX ORDER — NEOMYCIN SULFATE, POLYMYXIN B SULFATE AND DEXAMETHASONE 3.5; 10000; 1 MG/ML; [USP'U]/ML; MG/ML
SUSPENSION/ DROPS OPHTHALMIC
Qty: 5 | Refills: 0 | Status: ACTIVE
Start: 2025-07-28

## 2025-07-28 ASSESSMENT — INTRAOCULAR PRESSURE
OD: 20
OS: 16